# Patient Record
(demographics unavailable — no encounter records)

---

## 2025-01-04 NOTE — HISTORY OF PRESENT ILLNESS
[FreeTextEntry1] : rpa: spot check [de-identified] : 70F last seen June 2024 by Dr. Cintron with a history of nBCC on the R medial cheek s/p Mohs surgery 12/2023, presenting today for spot check, spots on the back and left lateral chest. Spots on the back are 'pimply' and raised, present x years, no treatments tried. Patient is bothered by cosmesis of chest lesions.

## 2025-01-04 NOTE — HISTORY OF PRESENT ILLNESS
[FreeTextEntry1] : rpa: spot check [de-identified] : 70F last seen June 2024 by Dr. Cintrno with a history of nBCC on the R medial cheek s/p Mohs surgery 12/2023, presenting today for spot check, spots on the back and left lateral chest. Spots on the back are 'pimply' and raised, present x years, no treatments tried. Patient is bothered by cosmesis of chest lesions.

## 2025-01-04 NOTE — PHYSICAL EXAM
[FreeTextEntry3] : Focused skin exam performed  The relevant portions of the exam were performed today  AAOx3, NAD, well-appearing / pleasant Focused examination within normal limits with the exception of: Well-demarcated stuck-on appearing brown papules with mamillated surface on the left lateral chest  Verrucous, stuck-on skin-toned and pink papules x 3 on the right mid-lateral back and left mid-spine  Linear scar left medial cheek w/o pigmentation or nodularity

## 2025-01-04 NOTE — HISTORY OF PRESENT ILLNESS
[FreeTextEntry1] : rpa: spot check [de-identified] : 70F last seen June 2024 by Dr. Cintron with a history of nBCC on the R medial cheek s/p Mohs surgery 12/2023, presenting today for spot check, spots on the back and left lateral chest. Spots on the back are 'pimply' and raised, present x years, no treatments tried. Patient is bothered by cosmesis of chest lesions.

## 2025-01-04 NOTE — ASSESSMENT
[FreeTextEntry1] : 1) Seborrheic keratosis, irritated (L82.0)     - I have discussed the nature and usual course with the Patient. Reassured.     - Because the lesions are irritated and cause pain, the Patient has requested removal.     - Cryotherapy to 3 lesions on the right mid-lateral back and left mid-spine administered without complication.     - Aftercare discussed.  2)Seborrheic keratoses (L82.1), left lateral superior chest     -Education, no intervention     -Counseled patient to notify us of any changes  3) Basal cell carcinoma of right medial cheek (C44.319)    - S/p 1 stage MMS 12/12/2023 with surgical defect repair by Dr. Banks.     - No evidence of recurrence. Continue to monitor.   RTC PRN.

## 2025-02-28 NOTE — HISTORY OF PRESENT ILLNESS
[FreeTextEntry8] : Pt had cold symptoms in January. Cold got better but last few weeks she has had swelling or right eyelid and crusted a little. It is sore too. No blurred vision.  No Headache, fever.

## 2025-02-28 NOTE — HEALTH RISK ASSESSMENT
[No] : In the past 12 months have you used drugs other than those required for medical reasons? No [Little interest or pleasure doing things] : 1) Little interest or pleasure doing things [Feeling down, depressed, or hopeless] : 2) Feeling down, depressed, or hopeless [0] : 2) Feeling down, depressed, or hopeless: Not at all (0) [PHQ-2 Negative - No further assessment needed] : PHQ-2 Negative - No further assessment needed [[id number]] : 0 [Never] : Never

## 2025-02-28 NOTE — PHYSICAL EXAM
[Normal Sclera/Conjunctiva] : normal sclera/conjunctiva [PERRL] : pupils equal round and reactive to light [EOMI] : extraocular movements intact [Normal] : affect was normal and insight and judgment were intact [de-identified] : mild edema of right upper eyelid with minimal erythema

## 2025-02-28 NOTE — ASSESSMENT
[FreeTextEntry1] : stye rx for erythromycin oint make ophthalmology appt - sees Dr Neely  HTN continue metoprolol  OAB tolterodine  migraines rarely takes depakote

## 2025-03-25 NOTE — HISTORY OF PRESENT ILLNESS
[FreeTextEntry1] : f/u BCC [de-identified] : 71F with a hx of BCC on the R cheek s/p Mohs (2023) here for skin check

## 2025-03-25 NOTE — PHYSICAL EXAM
[Alert] : alert [Oriented x 3] : ~L oriented x 3 [Well Nourished] : well nourished [Conjunctiva Non-injected] : conjunctiva non-injected [No Visual Lymphadenopathy] : no visual  lymphadenopathy [No Clubbing] : no clubbing [No Edema] : no edema [No Bromhidrosis] : no bromhidrosis [No Chromhidrosis] : no chromhidrosis [FreeTextEntry3] : The patient is well-appearing, in no acute distress, alert and oriented x 3. Mood and affect are normal. A complete cutaneous examination of the scalp, face, neck, chest, abdomen, back, bilateral arms, bilateral legs, buttocks, digits, nails, eyelids, conjunctiva and lips reveals the following significant findings: - Well-healed scar on the R cheek - Scaly erythematous papule on the nasal tip

## 2025-07-18 NOTE — ASSESSMENT
[Vaccines Reviewed] : Immunizations reviewed today. Please see immunization details in the vaccine log within the immunization flowsheet.  [FreeTextEntry1] : hyperlipidemia continue diet and exercise  palpitations has them intermittently she will f/u with cardiology  cough PND she will take zyrtec or claritin check cxr f/u if continues

## 2025-07-18 NOTE — HISTORY OF PRESENT ILLNESS
[de-identified] : c/o cough for a month. Does not have seasonal allergies. Feels a PND. Cough has been staying about the same and feels like phlegm in her throat. Not bringing anything up.

## 2025-07-18 NOTE — HEALTH RISK ASSESSMENT
[No] : In the past 12 months have you used drugs other than those required for medical reasons? No [No falls in past year] : Patient reported no falls in the past year [Little interest or pleasure doing things] : 1) Little interest or pleasure doing things [Feeling down, depressed, or hopeless] : 2) Feeling down, depressed, or hopeless [0] : 2) Feeling down, depressed, or hopeless: Not at all (0) [PHQ-2 Negative - No further assessment needed] : PHQ-2 Negative - No further assessment needed [None] : Patient does not have any barriers to medication adherence [Yes] : Reviewed medication list for presence of high-risk medications. [Opioids] : opioids [Never] : Never [Patient reported mammogram was normal] : Patient reported mammogram was normal [Fully functional (bathing, dressing, toileting, transferring, walking, feeding)] : Fully functional (bathing, dressing, toileting, transferring, walking, feeding) [Fully functional (using the telephone, shopping, preparing meals, housekeeping, doing laundry, using] : Fully functional and needs no help or supervision to perform IADLs (using the telephone, shopping, preparing meals, housekeeping, doing laundry, using transportation, managing medications and managing finances) [Audit-CScore] : 0 [[id number]] : 0 [Change in mental status noted] : No change in mental status noted [Language] : denies difficulty with language [Behavior] : denies difficulty with behavior [Learning/Retaining New Information] : denies difficulty learning/retaining new information [Handling Complex Tasks] : denies difficulty handling complex tasks [Reasoning] : denies difficulty with reasoning [Spatial Ability and Orientation] : denies difficulty with spatial ability and orientation [MammogramDate] : 05/25

## 2025-07-30 NOTE — DISCUSSION/SUMMARY
[Other: ____] : in [unfilled] [de-identified] : The underlying pathophysiology was reviewed with the patient. Discussed at length the nature of the patient's condition. She will continue with her current management.  Patient continues to be 100% disabled regarding her work.  She will follow up in 1 year for reassessment.

## 2025-07-30 NOTE — HISTORY OF PRESENT ILLNESS
[Yes] : The patient is currently working. [de-identified] : Patient returns today and continues to complain of lower back pain, which has worsened and progressed.  She reports continued radicular symptoms. She notes weakness to the lower extremities.  The patient continues to have symptoms pertaining to lower back. The patient reports worsening of right-sided hip pain. She is taking Mobic and Tylenol. She rates her pain as a 5 out of 10 at this time. Patient plans to resume chiropractic care.  [FreeTextEntry1] : Patient returns today and continues to complain of lower back pain, which has worsened and progressed.  She reports continued radicular symptoms. She notes weakness to the lower extremities.  The patient continues to have symptoms pertaining to lower back. The patient reports worsening of right-sided hip pain. She is taking Mobic and Tylenol.  Patient plans to resume chiropractic care. She rates her pain as a 5 out of 10 at this time. 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 [FreeTextEntry3] : Patient has limited ability to stand or sit in 1 position for prolonged periods of time and repetitive bending twisting and lifting more than 15 to 20 pounds without pain. [FreeTextEntry4] : Patient has had a full course of physical therapy and conservative treatment. [FreeTextEntry5] : None [FreeTextEntry7] : Patient has been working for quite some time in a limited capacity of 4 to 5 hours/day 3 days a week

## 2025-07-30 NOTE — END OF VISIT
[Time Spent: ___ minutes] : I have spent [unfilled] minutes of time on the encounter which excludes teaching and separately reported services. General

## 2025-07-30 NOTE — ADDENDUM
[FreeTextEntry1] :  I, Lisa Hesterd, acted solely as a scribe for Dr. Matthew Mills on this date 07/30/2025 .  All medical records entries made by the Scribe were at my Dr. Matthew Mills direction and personally dictated by me on 07/30/2025. I have reviewed the chart and agree that the record accurately reflects my personal performance of the history, physical exam, assessment and plan. I have also personally directed, reviewed, and agreed with the chart.

## 2025-07-30 NOTE — PHYSICAL EXAM
[LE] : Sensory: Intact in bilateral lower extremities [Normal RLE] : Right Lower Extremity: No scars, rashes, lesions, ulcers, skin intact [Normal LLE] : Left Lower Extremity: No scars, rashes, lesions, ulcers, skin intact [Normal] : Oriented to person, place, and time, insight and judgement were intact and the affect was normal [de-identified] : There is a limited range of motion in all planes secondary to pain.  There is a negative HIRA test bilaterally.